# Patient Record
Sex: FEMALE | Race: WHITE
[De-identification: names, ages, dates, MRNs, and addresses within clinical notes are randomized per-mention and may not be internally consistent; named-entity substitution may affect disease eponyms.]

---

## 2022-10-24 PROBLEM — R10.9 ABDOMINAL PAIN: Status: ACTIVE | Noted: 2022-10-24

## 2022-10-25 ENCOUNTER — APPOINTMENT (OUTPATIENT)
Dept: RADIOLOGY | Age: 67
End: 2022-10-25
Payer: MEDICARE

## 2022-10-25 ENCOUNTER — OFFICE VISIT (OUTPATIENT)
Dept: COLORECTAL SURGERY | Facility: CLINIC | Age: 67
End: 2022-10-25
Payer: MEDICARE

## 2022-10-25 VITALS — BODY MASS INDEX: 26.68 KG/M2 | WEIGHT: 145 LBS | HEIGHT: 62 IN

## 2022-10-25 DIAGNOSIS — K50.919 CROHN'S DISEASE WITH COMPLICATION, UNSPECIFIED GASTROINTESTINAL TRACT LOCATION (CMS/HCC): Primary | ICD-10-CM

## 2022-10-25 PROBLEM — K50.90 CROHN DISEASE (CMS/HCC): Status: ACTIVE | Noted: 2022-10-24

## 2022-10-25 PROCEDURE — 99204 OFFICE O/P NEW MOD 45 MIN: CPT | Performed by: COLON & RECTAL SURGERY

## 2022-10-25 RX ORDER — MESALAMINE 500 MG/1
1000 CAPSULE ORAL
COMMUNITY
Start: 2022-08-29

## 2022-10-25 RX ORDER — ERGOCALCIFEROL 1.25 MG/1
50000 CAPSULE ORAL
COMMUNITY

## 2022-10-25 RX ORDER — AMMONIUM LACTATE 12 G/100G
LOTION TOPICAL
COMMUNITY
Start: 2022-10-21

## 2022-10-25 RX ORDER — EZETIMIBE 10 MG/1
TABLET ORAL
COMMUNITY
Start: 2022-04-07

## 2022-10-25 RX ORDER — TRIAMCINOLONE ACETONIDE 1 MG/G
PASTE DENTAL
COMMUNITY

## 2022-10-25 RX ORDER — FOLIC ACID 0.8 MG
800 TABLET ORAL
COMMUNITY

## 2022-10-25 RX ORDER — LEVOTHYROXINE SODIUM 125 UG/1
112 TABLET ORAL
COMMUNITY

## 2022-10-25 RX ORDER — BISOPROLOL FUMARATE 10 MG/1
TABLET, FILM COATED ORAL
COMMUNITY
Start: 2021-12-16

## 2022-10-25 RX ORDER — BISOPROLOL FUMARATE AND HYDROCHLOROTHIAZIDE 10; 6.25 MG/1; MG/1
TABLET ORAL
COMMUNITY

## 2022-10-25 RX ORDER — VALACYCLOVIR HYDROCHLORIDE 500 MG/1
TABLET, FILM COATED ORAL
COMMUNITY
Start: 2022-10-15

## 2022-10-25 RX ORDER — DEXTROAMPHETAMINE SACCHARATE, AMPHETAMINE ASPARTATE, DEXTROAMPHETAMINE SULFATE AND AMPHETAMINE SULFATE 7.5; 7.5; 7.5; 7.5 MG/1; MG/1; MG/1; MG/1
TABLET ORAL
COMMUNITY
Start: 2022-03-03

## 2022-10-25 RX ORDER — IBUPROFEN 100 MG/5ML
3000 SUSPENSION, ORAL (FINAL DOSE FORM) ORAL
COMMUNITY

## 2022-10-25 RX ORDER — OLMESARTAN MEDOXOMIL 5 MG/1
TABLET ORAL
COMMUNITY
Start: 2022-08-27

## 2022-10-25 RX ORDER — HYOSCYAMINE SULFATE 0.12 MG/1
0.12 TABLET SUBLINGUAL EVERY 6 HOURS PRN
Qty: 30 TABLET | Refills: 1 | Status: SHIPPED | OUTPATIENT
Start: 2022-10-25 | End: 2022-11-24

## 2022-10-25 RX ORDER — ASPIRIN 81 MG/1
TABLET ORAL
COMMUNITY

## 2022-10-25 RX ORDER — VEDOLIZUMAB 300 MG/5ML
INJECTION, POWDER, LYOPHILIZED, FOR SOLUTION INTRAVENOUS
COMMUNITY
Start: 2022-10-13

## 2022-10-25 RX ORDER — ROSUVASTATIN CALCIUM 40 MG/1
40 TABLET, COATED ORAL
COMMUNITY
Start: 2022-08-18

## 2022-10-25 RX ORDER — IBUPROFEN 800 MG/1
TABLET ORAL
COMMUNITY

## 2022-10-25 NOTE — ASSESSMENT & PLAN NOTE
Crohn's disease/ cholelithiasis/ diverticulitis    ?: segmental colon resection (Dr. Hernadez? @[facility] per pt)     CT: 2/22/2021(enterography), 8/16/2022(enterography), 9/16/2022(C)    FFC: 12/14/2011, 6/8/2015, 12/20/2017, 7/20/2021    GI: Dr. Hadley Mathias    She reminds me she is one of my first laparoscopic cases and is we look at things she is actually the ninth from August 1996.  She is seeing Dr. Mathias who sends her up here for evaluation.  At this point she has had serially elevated LFTs is unclear to me the etiology of this.  Dr. Mathias raise a question of the gallbladder and I think I would have her seen by hepatologist of which we have an excellent 1 year Dr. Cota and refer her there.  For her bloating and cramping we will try her on a dairy free diet for a month and Levsin sublingually for the cramping as needed.  She will [place] back to us when she is done these things and we will see how she is doing.

## 2022-10-25 NOTE — PROGRESS NOTES
"History and Physical    Patient was referred by Dr Mathias (GI, [place]).   The diagnosis on referral was  condyloma\", Chron's Disease of the  entire colon.    The presenting symptom was  pain of the  abdomen.    HPI:    this is a ernie 66-year-old female who was referred to Dr. Hernadez for Crohn's disease with associated hepatobiliary issues and labs. Patient has constipation dependent disease, and had surgery with Dr. Hernadez back in  (segmental left colectomy). She denies nausea, vomiting, weight loss, or inability to tolerate food. She does have bilateral lower quadrant crampy abdominal pain, which is relieved when passing gas and when passing stool. There is no blood in her stool. There is no fecal incontinence. There is some urinary incontinence, which she blames on age.  Last colonoscopy was 2021, which showed no mucosal disease.  On her lab work, there were elevations of her liver function tests  GI issues included:   No nausea,  no vomiting, no bloating, abdominal pain, bowel habits were constipation, weight loss  denies,  Patient goes to the bathroom  .  Patient experiences fecal accidents  never,  She has lifelong constipation and needs to take Miralax to have a BM. She currently takes 2 citrucel daily     Delivery History:  ,  episiotomy,    Pelvic Floor Disorders:  urinary incontinence,    PMH:  Previous Cancer:  No    Cardiovascular History: MI:  No,  Angina:  No, CHF:  No,  HTN:  Yes,  Valve Disease:  No,  AFIB  Yes,  Other: No    Pulmonary Disease: COPD:  No,  Asthma:  No, Emphysema:  Yes, Pulmonary HTN:  No,  Other: Yes    Endocrine:  IDDM:  No,  NIDDM:  Yes, Other: no    Coagulation Disorders:  DVT:  no, Located in the N/A,  Pulmonary Embolus:  No, Clotting Factor Deficiency:  No, Thrombocytopenia:  No,      Morbid Obesity:  No  Elevated Cholesterol:  No    PSH (ABDOMINAL):  colonic resection. cholecystectomy    MEDICATIONS:     Current Outpatient Medications:     ascorbic acid, " vitamin C, (VITAMIN C) 1,000 mg tablet, Take 3,000 mg by mouth., Disp: , Rfl:     aspirin 81 mg enteric coated tablet, aspirin 81 mg tablet,delayed release  TAKE 1 TABLET BY MOUTH DAILY, Disp: , Rfl:     bisoprolol (ZEBETA) 10 mg tablet, bisoprolol fumarate 10 mg tablet, Disp: , Rfl:     dextroamphetamine-amphetamine (ADDERALL) 30 mg tablet, dextroamphetamine-amphetamine 30 mg tablet  TAKE 1 TABLET BY MOUTH THREE TIMES DAILY, Disp: , Rfl:     ENTYVIO 300 mg recon soln, , Disp: , Rfl:     ergocalciferol (VITAMIN D2) 50,000 unit(1250 mcg) capsule, Take 50,000 Units by mouth., Disp: , Rfl:     ezetimibe (ZETIA) 10 mg tablet, ezetimibe 10 mg tablet, Disp: , Rfl:     folic acid (FOLVITE) 800 mcg tablet, Take 800 mcg by mouth., Disp: , Rfl:     ibuprofen (MOTRIN) 800 mg tablet, ibuprofen 800 mg tablet, Disp: , Rfl:     ipratropium-albuteroL (COMBIVENT RESPIMAT)  mcg/actuation inhaler, Inhale 1 puff., Disp: , Rfl:     levothyroxine (SYNTHROID) 125 mcg tablet, 112 mcg., Disp: , Rfl:     olmesartan (BENICAR) 5 mg tablet, TAKE 1 TABLET BY MOUTH DAILY TAKWE WITH DINNER, Disp: , Rfl:     PENTASA 500 mg CR capsule, Take 1,000 mg by mouth 2 (two) times a day., Disp: , Rfl:     rosuvastatin (CRESTOR) 40 mg tablet, Take 40 mg by mouth once daily., Disp: , Rfl:     triamcinolone (KENALOG) 0.1 % paste, triamcinolone acetonide 0.1 % dental paste  APPLY TO AFFECTED AREA 3-4 TIMES A DAY, Disp: , Rfl:     valACYclovir (VALTREX) 500 mg tablet, TAKE 1 TABLET BY MOUTH TWICE DAILY FOR 30 DAYS, Disp: , Rfl:     ammonium lactate (LAC-HYDRIN) 12 % lotion, , Disp: , Rfl:     bisoproloL-hydrochlorothiazide (ZIAC) 10-6.25 mg per tablet, bisoprolol 10 mg-hydrochlorothiazide 6.25 mg tablet  TAKE 1 TABLET BY MOUTH DAILY. STOP THE ZIAC 5/6.25 AND. START THIS DOSE, Disp: , Rfl:     docosahexaenoic acid-epa 120-180 mg capsule, Take 1,000 mg by mouth daily., Disp: , Rfl:     hyoscyamine (LEVSIN/SL) 0.125 mg SL tablet, Place 1  tablet (0.125 mg total) under the tongue every 6 (six) hours as needed for cramping., Disp: 30 tablet, Rfl: 1    DRUG ALLERGIES:   Allergies   Allergen Reactions    Infliximab        FAMILY HISTORY:  Family History   Problem Relation Age of Onset    Heart disease Biological Mother        SOCIAL HISTORY:  Smoking History:   quit,  Cigarettes, number of years 6 years ago after smoking 1 ppd x 20 years    Alcohol Use: Number of drinks per week (PUT IN NUMBER) Socially  History of Alcoholism:    No, Number of years sober: AN    Social History     Socioeconomic History    Marital status:      Spouse name: Not on file    Number of children: Not on file    Years of education: Not on file    Highest education level: Not on file   Occupational History    Not on file   Tobacco Use    Smoking status: Former     Types: Cigarettes    Smokeless tobacco: Never   Substance and Sexual Activity    Alcohol use: Not Currently    Drug use: Not on file    Sexual activity: Not on file   Other Topics Concern    Not on file   Social History Narrative    Not on file     Social Determinants of Health     Financial Resource Strain: Not on file   Food Insecurity: Not on file   Transportation Needs: Not on file   Physical Activity: Not on file   Stress: Not on file   Social Connections: Not on file   Intimate Partner Violence: Not on file   Housing Stability: Not on file       Review of Systems   Constitutional: Negative.    HENT: Negative.    Eyes: Negative.    Respiratory: Negative.    Cardiovascular: Negative.    Gastrointestinal: Negative.    Endocrine: Negative.    Genitourinary: Negative.    Musculoskeletal: Negative.    Skin: Negative.    Allergic/Immunologic: Negative.    Neurological: Negative.    Hematological: Negative.          Physical Exam  Constitutional:       Appearance: Normal appearance.   HENT:      Head: Normocephalic.      Nose: Nose normal.      Mouth/Throat:      Mouth: Mucous membranes are moist.    Eyes:      Pupils: Pupils are equal, round, and reactive to light.   Cardiovascular:      Rate and Rhythm: Normal rate.      Pulses: Normal pulses.   Pulmonary:      Effort: Pulmonary effort is normal.   Abdominal:      General: Abdomen is flat.      Palpations: Abdomen is soft and non-tender     No scars are appreciated   Musculoskeletal:         General: Normal range of motion.      Cervical back: Normal range of motion.   Skin:     General: Skin is warm.      Capillary Refill: Capillary refill takes less than 2 seconds.   Neurological:      General: No focal deficit present.      Mental Status: She is alert and oriented to person, place, and time.   Psychiatric:         Mood and Affect: Mood normal    Abdominal Exam: She is a small incision suprapubically and then Rocha reports supraumbilically right lower quadrant and a 5 mm port left lower quadrant    Perineal Inspection: Normal  Digital Rectal Exam: Normal   Fiberoptic Flexible Sigmoidoscopy: held  IMAGING: None    Assessment and Plan:   The patient is doing great from a colorectal standpoint - she has no visible scars from her colectomy, which is one of Dr. Lewis's first laparoscopic procedures at [facility]. She is asymptomatic for her gallstones and her LFTs are mildly elevated. With the association of HPB disease and Chron's, we will refer her to our GI colleagues.      Crohn disease (CMS/HCC)  Crohn's disease/ cholelithiasis/ diverticulitis    ?: segmental colon resection (Dr. Hernadez? @[facility] per pt)     CT: 2/22/2021(enterography), 8/16/2022(enterography), 9/16/2022(C)    FFC: 12/14/2011, 6/8/2015, 12/20/2017, 7/20/2021    GI: Dr. Hadley Mathias    She reminds me she is one of my first laparoscopic cases and is we look at things she is actually the ninth from August 1996.  She is seeing Dr. Mathias who sends her up here for evaluation.  At this point she has had serially elevated LFTs is unclear to me the etiology of this.  Dr. Mathias raise a  question of the gallbladder and I think I would have her seen by hepatologist of which we have an excellent 1 year Dr. Cota and refer her there.  For her bloating and cramping we will try her on a dairy free diet for a month and Levsin sublingually for the cramping as needed.  She will [place] back to us when she is done these things and we will see how she is doing.

## 2024-04-09 ENCOUNTER — NEW PATIENT COMPREHENSIVE (OUTPATIENT)
Dept: URBAN - METROPOLITAN AREA CLINIC 100 | Facility: CLINIC | Age: 69
End: 2024-04-09

## 2024-04-09 DIAGNOSIS — H52.4: ICD-10-CM

## 2024-04-09 DIAGNOSIS — R51.9: ICD-10-CM

## 2024-04-09 DIAGNOSIS — D31.31: ICD-10-CM

## 2024-04-09 DIAGNOSIS — H25.813: ICD-10-CM

## 2024-04-09 PROCEDURE — 92015 DETERMINE REFRACTIVE STATE: CPT

## 2024-04-09 PROCEDURE — 92250 FUNDUS PHOTOGRAPHY W/I&R: CPT

## 2024-04-09 PROCEDURE — 99204 OFFICE O/P NEW MOD 45 MIN: CPT

## 2024-04-09 ASSESSMENT — VISUAL ACUITY
OD_SC: 20/80
OS_SC: 20/80
OS_CC: 20/25
OS_SC: 20/30
OD_CC: 20/25
OD_SC: 20/30

## 2024-04-09 ASSESSMENT — TONOMETRY
OS_IOP_MMHG: 14
OD_IOP_MMHG: 14